# Patient Record
Sex: MALE | Race: ASIAN
[De-identification: names, ages, dates, MRNs, and addresses within clinical notes are randomized per-mention and may not be internally consistent; named-entity substitution may affect disease eponyms.]

---

## 2023-07-19 ENCOUNTER — OFFICE VISIT (OUTPATIENT)
Facility: CLINIC | Age: 48
End: 2023-07-19
Payer: MEDICAID

## 2023-07-19 VITALS
HEIGHT: 60 IN | HEART RATE: 80 BPM | WEIGHT: 125.6 LBS | TEMPERATURE: 97.5 F | RESPIRATION RATE: 16 BRPM | DIASTOLIC BLOOD PRESSURE: 83 MMHG | BODY MASS INDEX: 24.66 KG/M2 | SYSTOLIC BLOOD PRESSURE: 123 MMHG | OXYGEN SATURATION: 98 %

## 2023-07-19 DIAGNOSIS — E55.9 VITAMIN D DEFICIENCY: ICD-10-CM

## 2023-07-19 DIAGNOSIS — Z12.11 COLON CANCER SCREENING: ICD-10-CM

## 2023-07-19 DIAGNOSIS — S88.119A BELOW KNEE AMPUTATION (HCC): ICD-10-CM

## 2023-07-19 DIAGNOSIS — G89.29 CHRONIC BILATERAL LOW BACK PAIN WITH BILATERAL SCIATICA: ICD-10-CM

## 2023-07-19 DIAGNOSIS — Z02.89 MEDICATION MANAGEMENT CONTRACT SIGNED: ICD-10-CM

## 2023-07-19 DIAGNOSIS — Z76.89 ENCOUNTER TO ESTABLISH CARE: ICD-10-CM

## 2023-07-19 DIAGNOSIS — Z87.828 HISTORY OF GUNSHOT WOUND: Primary | ICD-10-CM

## 2023-07-19 DIAGNOSIS — M54.41 CHRONIC BILATERAL LOW BACK PAIN WITH BILATERAL SCIATICA: ICD-10-CM

## 2023-07-19 DIAGNOSIS — M54.42 CHRONIC BILATERAL LOW BACK PAIN WITH BILATERAL SCIATICA: ICD-10-CM

## 2023-07-19 PROCEDURE — 99204 OFFICE O/P NEW MOD 45 MIN: CPT

## 2023-07-19 RX ORDER — GABAPENTIN 300 MG/1
300 CAPSULE ORAL 2 TIMES DAILY
Qty: 180 CAPSULE | Refills: 1 | Status: SHIPPED | OUTPATIENT
Start: 2023-07-19 | End: 2024-01-15

## 2023-07-19 SDOH — ECONOMIC STABILITY: FOOD INSECURITY: WITHIN THE PAST 12 MONTHS, THE FOOD YOU BOUGHT JUST DIDN'T LAST AND YOU DIDN'T HAVE MONEY TO GET MORE.: NEVER TRUE

## 2023-07-19 SDOH — ECONOMIC STABILITY: FOOD INSECURITY: WITHIN THE PAST 12 MONTHS, YOU WORRIED THAT YOUR FOOD WOULD RUN OUT BEFORE YOU GOT MONEY TO BUY MORE.: NEVER TRUE

## 2023-07-19 SDOH — ECONOMIC STABILITY: HOUSING INSECURITY
IN THE LAST 12 MONTHS, WAS THERE A TIME WHEN YOU DID NOT HAVE A STEADY PLACE TO SLEEP OR SLEPT IN A SHELTER (INCLUDING NOW)?: NO

## 2023-07-19 SDOH — ECONOMIC STABILITY: INCOME INSECURITY: HOW HARD IS IT FOR YOU TO PAY FOR THE VERY BASICS LIKE FOOD, HOUSING, MEDICAL CARE, AND HEATING?: NOT HARD AT ALL

## 2023-07-19 ASSESSMENT — PATIENT HEALTH QUESTIONNAIRE - PHQ9
SUM OF ALL RESPONSES TO PHQ QUESTIONS 1-9: 0
SUM OF ALL RESPONSES TO PHQ QUESTIONS 1-9: 0
2. FEELING DOWN, DEPRESSED OR HOPELESS: 0
1. LITTLE INTEREST OR PLEASURE IN DOING THINGS: 0
SUM OF ALL RESPONSES TO PHQ QUESTIONS 1-9: 0
SUM OF ALL RESPONSES TO PHQ9 QUESTIONS 1 & 2: 0
SUM OF ALL RESPONSES TO PHQ QUESTIONS 1-9: 0

## 2023-07-19 NOTE — PROGRESS NOTES
Himanshu Ellis is a 51 y.o. presents today for   Chief Complaint   Patient presents with    Establish Care       Is someone accompanying this pt? YES,DAUGHTER IN WAITING ROOM     Is the patient using any DME equipment during OV? NO    Depression Screening:   PHQ-9 Questionaire 7/19/2023   Little interest or pleasure in doing things 0   Feeling down, depressed, or hopeless 0   PHQ-9 Total Score 0       Abuse Screening: AMB Abuse Screening 7/19/2023   Do you ever feel afraid of your partner? N   Are you in a relationship with someone who physically or mentally threatens you? N   Is it safe for you to go home? Y       Learning Assessment:  Who is the primary learner? Patient    What is the preferred language for health care of the primary learner? OTHER (COMMENT) [place]an   How does the primary learner prefer to learn new concepts? DEMONSTRATION    How does the primary learner prefer to learn new concepts? PICTURES    Answered By  #[id number]   Relationship to Learner SELF    Highest level of education completed by primary learner? DID NOT GRADUATE HIGH SCHOOL    Are there any barriers / factors that could impact learning? LANGUAGE    Will there be a co-learner / caregiver? No    Co-Learner / Caregiver Name (if applicable) N        Fall Risk:  No flowsheet data found. Coordination of Care:   1. \"Have you been to the ER, urgent care clinic since your last visit? Hospitalized since your last visit? \" NO    2. \"Have you seen or consulted any other health care providers outside of the [address] [place] [address] Avenue since your last visit? \" [place]        If the patient is female:    4. For patients aged 43-66: Has the patient had a mammogram within the past 2 years? NA     5. For patients aged 21-65: Has the patient had a pap smear? NA    Health Maintenance: reviewed and discussed and ordered per Provider.     Health Maintenance Due   Topic Date Due    COVID-19 Vaccine (1) Never done    Depression Screen

## 2023-07-20 DIAGNOSIS — M54.41 CHRONIC BILATERAL LOW BACK PAIN WITH BILATERAL SCIATICA: ICD-10-CM

## 2023-07-20 DIAGNOSIS — M54.42 CHRONIC BILATERAL LOW BACK PAIN WITH BILATERAL SCIATICA: ICD-10-CM

## 2023-07-20 DIAGNOSIS — G89.29 CHRONIC BILATERAL LOW BACK PAIN WITH BILATERAL SCIATICA: ICD-10-CM

## 2023-07-23 RX ORDER — GABAPENTIN 300 MG/1
300 CAPSULE ORAL 2 TIMES DAILY
Qty: 180 CAPSULE | Refills: 1 | OUTPATIENT
Start: 2023-07-23 | End: 2024-01-19

## 2023-08-03 ENCOUNTER — TELEPHONE (OUTPATIENT)
Facility: CLINIC | Age: 48
End: 2023-08-03

## 2023-08-03 NOTE — TELEPHONE ENCOUNTER
:PT facility is calling requesting for a fax to be sent indicationg \"Treat and Eval\" for new prosetitis. Please fax attn: Lisha Camilo at [phone]    Please advise.     Thank you

## 2023-08-04 DIAGNOSIS — Z44.9 PROSTHESIS FITTING: Primary | ICD-10-CM

## 2023-08-11 ENCOUNTER — HOSPITAL ENCOUNTER (OUTPATIENT)
Facility: HOSPITAL | Age: 48
Setting detail: SPECIMEN
End: 2023-08-11
Payer: MEDICAID

## 2023-08-11 DIAGNOSIS — G89.29 CHRONIC BILATERAL LOW BACK PAIN WITH BILATERAL SCIATICA: ICD-10-CM

## 2023-08-11 DIAGNOSIS — M54.41 CHRONIC BILATERAL LOW BACK PAIN WITH BILATERAL SCIATICA: ICD-10-CM

## 2023-08-11 DIAGNOSIS — Z87.828 HISTORY OF GUNSHOT WOUND: ICD-10-CM

## 2023-08-11 DIAGNOSIS — E55.9 VITAMIN D DEFICIENCY: ICD-10-CM

## 2023-08-11 DIAGNOSIS — Z76.89 ENCOUNTER TO ESTABLISH CARE: ICD-10-CM

## 2023-08-11 DIAGNOSIS — M54.42 CHRONIC BILATERAL LOW BACK PAIN WITH BILATERAL SCIATICA: ICD-10-CM

## 2023-08-11 LAB
25(OH)D3 SERPL-MCNC: 19.2 NG/ML (ref 30–100)
ALBUMIN SERPL-MCNC: 4 G/DL (ref 3.4–5)
ALBUMIN/GLOB SERPL: 1.1 (ref 0.8–1.7)
ALP SERPL-CCNC: 64 U/L (ref 45–117)
ALT SERPL-CCNC: 46 U/L (ref 16–61)
ANION GAP SERPL CALC-SCNC: 6 MMOL/L (ref 3–18)
APPEARANCE UR: CLEAR
AST SERPL-CCNC: 27 U/L (ref 10–38)
BACTERIA URNS QL MICRO: NEGATIVE /HPF
BASOPHILS # BLD: 0 K/UL (ref 0–0.1)
BASOPHILS NFR BLD: 1 % (ref 0–2)
BILIRUB DIRECT SERPL-MCNC: 0.1 MG/DL (ref 0–0.2)
BILIRUB SERPL-MCNC: 0.9 MG/DL (ref 0.2–1)
BILIRUB UR QL: NEGATIVE
BUN SERPL-MCNC: 12 MG/DL (ref 7–18)
BUN/CREAT SERPL: 13 (ref 12–20)
CALCIUM SERPL-MCNC: 9.4 MG/DL (ref 8.5–10.1)
CHLORIDE SERPL-SCNC: 103 MMOL/L (ref 100–111)
CHOLEST SERPL-MCNC: 286 MG/DL
CO2 SERPL-SCNC: 26 MMOL/L (ref 21–32)
COLOR UR: YELLOW
CREAT SERPL-MCNC: 0.94 MG/DL (ref 0.6–1.3)
DIFFERENTIAL METHOD BLD: ABNORMAL
EOSINOPHIL # BLD: 0.4 K/UL (ref 0–0.4)
EOSINOPHIL NFR BLD: 5 % (ref 0–5)
EPITH CASTS URNS QL MICRO: NEGATIVE /LPF (ref 0–5)
ERYTHROCYTE [DISTWIDTH] IN BLOOD BY AUTOMATED COUNT: 12.8 % (ref 11.6–14.5)
EST. AVERAGE GLUCOSE BLD GHB EST-MCNC: 105 MG/DL
GLOBULIN SER CALC-MCNC: 3.6 G/DL (ref 2–4)
GLUCOSE SERPL-MCNC: 88 MG/DL (ref 74–99)
GLUCOSE UR STRIP.AUTO-MCNC: NEGATIVE MG/DL
HBA1C MFR BLD: 5.3 % (ref 4.2–5.6)
HCT VFR BLD AUTO: 45.5 % (ref 36–48)
HDLC SERPL-MCNC: 67 MG/DL (ref 40–60)
HDLC SERPL: 4.3 (ref 0–5)
HGB BLD-MCNC: 15 G/DL (ref 13–16)
HGB UR QL STRIP: NEGATIVE
IMM GRANULOCYTES # BLD AUTO: 0 K/UL (ref 0–0.04)
IMM GRANULOCYTES NFR BLD AUTO: 0 % (ref 0–0.5)
KETONES UR QL STRIP.AUTO: NEGATIVE MG/DL
LDLC SERPL CALC-MCNC: 168.6 MG/DL (ref 0–100)
LEUKOCYTE ESTERASE UR QL STRIP.AUTO: NEGATIVE
LIPID PANEL: ABNORMAL
LYMPHOCYTES # BLD: 2.1 K/UL (ref 0.9–3.6)
LYMPHOCYTES NFR BLD: 27 % (ref 21–52)
MCH RBC QN AUTO: 30.9 PG (ref 24–34)
MCHC RBC AUTO-ENTMCNC: 33 G/DL (ref 31–37)
MCV RBC AUTO: 93.6 FL (ref 78–100)
MONOCYTES # BLD: 1 K/UL (ref 0.05–1.2)
MONOCYTES NFR BLD: 12 % (ref 3–10)
NEUTS SEG # BLD: 4.2 K/UL (ref 1.8–8)
NEUTS SEG NFR BLD: 55 % (ref 40–73)
NITRITE UR QL STRIP.AUTO: NEGATIVE
NRBC # BLD: 0 K/UL (ref 0–0.01)
NRBC BLD-RTO: 0 PER 100 WBC
PH UR STRIP: 5.5 (ref 5–8)
PLATELET # BLD AUTO: 231 K/UL (ref 135–420)
PMV BLD AUTO: 10.2 FL (ref 9.2–11.8)
POTASSIUM SERPL-SCNC: 4.2 MMOL/L (ref 3.5–5.5)
PROT SERPL-MCNC: 7.6 G/DL (ref 6.4–8.2)
PROT UR STRIP-MCNC: NEGATIVE MG/DL
RBC # BLD AUTO: 4.86 M/UL (ref 4.35–5.65)
RBC #/AREA URNS HPF: NEGATIVE /HPF (ref 0–5)
SODIUM SERPL-SCNC: 135 MMOL/L (ref 136–145)
SP GR UR REFRACTOMETRY: 1.01 (ref 1–1.03)
TRIGL SERPL-MCNC: 252 MG/DL
TSH SERPL DL<=0.05 MIU/L-ACNC: 3.24 UIU/ML (ref 0.36–3.74)
UROBILINOGEN UR QL STRIP.AUTO: 0.2 EU/DL (ref 0.2–1)
VLDLC SERPL CALC-MCNC: 50.4 MG/DL
WBC # BLD AUTO: 7.7 K/UL (ref 4.6–13.2)
WBC URNS QL MICRO: NORMAL /HPF (ref 0–4)

## 2023-08-11 PROCEDURE — 80076 HEPATIC FUNCTION PANEL: CPT

## 2023-08-11 PROCEDURE — 81001 URINALYSIS AUTO W/SCOPE: CPT

## 2023-08-11 PROCEDURE — 36415 COLL VENOUS BLD VENIPUNCTURE: CPT

## 2023-08-11 PROCEDURE — 84443 ASSAY THYROID STIM HORMONE: CPT

## 2023-08-11 PROCEDURE — 80061 LIPID PANEL: CPT

## 2023-08-11 PROCEDURE — 82306 VITAMIN D 25 HYDROXY: CPT

## 2023-08-11 PROCEDURE — 80048 BASIC METABOLIC PNL TOTAL CA: CPT

## 2023-08-11 PROCEDURE — 85025 COMPLETE CBC W/AUTO DIFF WBC: CPT

## 2023-08-11 PROCEDURE — 83036 HEMOGLOBIN GLYCOSYLATED A1C: CPT

## 2023-08-21 ENCOUNTER — OFFICE VISIT (OUTPATIENT)
Facility: CLINIC | Age: 48
End: 2023-08-21
Payer: MEDICAID

## 2023-08-21 VITALS
RESPIRATION RATE: 17 BRPM | BODY MASS INDEX: 24.94 KG/M2 | OXYGEN SATURATION: 97 % | HEART RATE: 83 BPM | DIASTOLIC BLOOD PRESSURE: 72 MMHG | WEIGHT: 127 LBS | HEIGHT: 60 IN | SYSTOLIC BLOOD PRESSURE: 113 MMHG | TEMPERATURE: 97.9 F

## 2023-08-21 DIAGNOSIS — H10.9 BACTERIAL CONJUNCTIVITIS OF BOTH EYES: ICD-10-CM

## 2023-08-21 DIAGNOSIS — M54.42 CHRONIC BILATERAL LOW BACK PAIN WITH BILATERAL SCIATICA: ICD-10-CM

## 2023-08-21 DIAGNOSIS — G89.29 CHRONIC BILATERAL LOW BACK PAIN WITH BILATERAL SCIATICA: ICD-10-CM

## 2023-08-21 DIAGNOSIS — M54.41 CHRONIC BILATERAL LOW BACK PAIN WITH BILATERAL SCIATICA: ICD-10-CM

## 2023-08-21 DIAGNOSIS — B96.89 BACTERIAL CONJUNCTIVITIS OF BOTH EYES: ICD-10-CM

## 2023-08-21 DIAGNOSIS — E78.2 MIXED HYPERLIPIDEMIA: ICD-10-CM

## 2023-08-21 DIAGNOSIS — H53.8 BLURRED VISION, BILATERAL: ICD-10-CM

## 2023-08-21 DIAGNOSIS — E55.9 VITAMIN D DEFICIENCY: Primary | ICD-10-CM

## 2023-08-21 PROCEDURE — 99213 OFFICE O/P EST LOW 20 MIN: CPT

## 2023-08-21 RX ORDER — ERGOCALCIFEROL 1.25 MG/1
50000 CAPSULE ORAL WEEKLY
Qty: 12 CAPSULE | Refills: 1 | Status: SHIPPED | OUTPATIENT
Start: 2023-08-21

## 2023-08-21 RX ORDER — ASPIRIN 81 MG/1
81 TABLET ORAL DAILY
Qty: 90 TABLET | Refills: 3 | Status: SHIPPED | OUTPATIENT
Start: 2023-08-21

## 2023-08-21 RX ORDER — OMEGA-3/DHA/EPA/FISH OIL 300-1000MG
2 CAPSULE ORAL DAILY
Qty: 90 CAPSULE | Refills: 0 | Status: SHIPPED | OUTPATIENT
Start: 2023-08-21

## 2023-08-21 RX ORDER — ATORVASTATIN CALCIUM 40 MG/1
40 TABLET, FILM COATED ORAL DAILY
Qty: 30 TABLET | Refills: 3 | Status: SHIPPED | OUTPATIENT
Start: 2023-08-21

## 2023-08-21 RX ORDER — GABAPENTIN 300 MG/1
300 CAPSULE ORAL 2 TIMES DAILY
Qty: 180 CAPSULE | Refills: 1 | Status: SHIPPED | OUTPATIENT
Start: 2023-08-21 | End: 2024-02-17

## 2023-08-21 RX ORDER — POLYMYXIN B SULFATE AND TRIMETHOPRIM 1; 10000 MG/ML; [USP'U]/ML
1 SOLUTION OPHTHALMIC EVERY 4 HOURS
Qty: 3 ML | Refills: 0 | Status: SHIPPED | OUTPATIENT
Start: 2023-08-21 | End: 2023-08-31

## 2023-08-21 SDOH — ECONOMIC STABILITY: INCOME INSECURITY: HOW HARD IS IT FOR YOU TO PAY FOR THE VERY BASICS LIKE FOOD, HOUSING, MEDICAL CARE, AND HEATING?: NOT HARD AT ALL

## 2023-08-21 SDOH — ECONOMIC STABILITY: FOOD INSECURITY: WITHIN THE PAST 12 MONTHS, THE FOOD YOU BOUGHT JUST DIDN'T LAST AND YOU DIDN'T HAVE MONEY TO GET MORE.: NEVER TRUE

## 2023-08-21 SDOH — ECONOMIC STABILITY: FOOD INSECURITY: WITHIN THE PAST 12 MONTHS, YOU WORRIED THAT YOUR FOOD WOULD RUN OUT BEFORE YOU GOT MONEY TO BUY MORE.: NEVER TRUE

## 2023-08-21 ASSESSMENT — PATIENT HEALTH QUESTIONNAIRE - PHQ9
SUM OF ALL RESPONSES TO PHQ QUESTIONS 1-9: 0
SUM OF ALL RESPONSES TO PHQ9 QUESTIONS 1 & 2: 0
SUM OF ALL RESPONSES TO PHQ QUESTIONS 1-9: 0
2. FEELING DOWN, DEPRESSED OR HOPELESS: 0
1. LITTLE INTEREST OR PLEASURE IN DOING THINGS: 0

## 2023-08-21 NOTE — PROGRESS NOTES
Dania Talavera is a 51y.o. year old male who presents in office today for   Chief Complaint   Patient presents with    Discuss Labs    Follow-up       Is someone accompanying this pt? Yes, daughter    Is the patient using any DME equipment during [address]? no    Depression Screening:   PHQ-9 Questionaire 8/21/2023 7/19/2023   Little interest or pleasure in doing things 0 0   Feeling down, depressed, or hopeless 0 0   PHQ-9 Total Score 0 0       Abuse Screening: AMB Abuse Screening 8/21/2023 7/19/2023   Do you ever feel afraid of your partner? N N   Are you in a relationship with someone who physically or mentally threatens you? N N   Is it safe for you to go home? Y Y       Learning Assessment:  No question data found. Fall Risk:  No flowsheet data found. Coordination of Care:   1. \"Have you been to the ER, urgent care clinic since your last visit? Hospitalized since your last visit? \" no    2. \"Have you seen or consulted any other health care providers outside of the [address] [place] [address] since your last visit? \" no    3. For patients aged 43-73: Has the patient had a colonoscopy / FIT/ Cologuard? due    If the patient is female:    4. For patients aged 43-66: Has the patient had a mammogram within the past 2 years? na    5. For patients aged 21-65: Has the patient had a pap smear? na    Health Maintenance: reviewed and discussed and ordered per Provider.     Health Maintenance Due   Topic Date Due    COVID-19 Vaccine (1) Never done    HIV screen  Never done    Hepatitis C screen  Never done    DTaP/Tdap/Td vaccine (1 - Tdap) Never done    Colorectal Cancer Screen  Never done    Flu vaccine (1) Never done        -Sandra Laguna [id number]  [place], [address] [place] [address]  Ph: [phone]

## 2023-08-21 NOTE — PROGRESS NOTES
Ga Dunne (: 1975) is a 51 y.o. male, established patient, here for evaluation of the following chief complaint(s):  Discuss Labs and Follow-up             Subjective:     HPI:  [facility]  was used for encounter. Patient presents for follow-up of lab results. He reports that he has been having yellow crusting to eyelids, no drainage. He denies any eye pain but does report blurred vision. He denies any recent fevers, no cellulitis to eyes or face. No past medical history on file. No past surgical history on file. Current Outpatient Medications   Medication Sig Dispense Refill    vitamin D (ERGOCALCIFEROL) 1.25 MG (23107 UT) CAPS capsule Take 1 capsule by mouth once a week 12 capsule 1    trimethoprim-polymyxin b (POLYTRIM) 30839-5.1 UNIT/ML-% ophthalmic solution Place 1 drop into both eyes every 4 hours for 10 days 3 mL 0    fish oil-omega-3 fatty acids 1000 MG capsule Take 2 capsules by mouth daily 90 capsule 0    atorvastatin (LIPITOR) 40 MG tablet Take 1 tablet by mouth daily 30 tablet 3    aspirin 81 MG EC tablet Take 1 tablet by mouth daily 90 tablet 3    gabapentin (NEURONTIN) 300 MG capsule Take 1 capsule by mouth 2 times daily for 180 days. Intended supply: 90 days Max Daily Amount: 600 mg 180 capsule 1     No current facility-administered medications for this visit.           ROS:    General: negative for - chills, fever, weight changes or malaise  HEENT: no sore throat, nasal congestion, vision problems or ear problems  Respiratory: no cough, shortness of breath, or wheezing  Cardiovascular: no chest pain, palpitations, or dyspnea on exertion  Gastrointestinal: no abdominal pain, N/V, change in bowel habits  Musculoskeletal: no back pain or joint pain  Neurological: no headache or dizziness  Endo:  No polyuria or polydipsia  : no urinary  Psychological: negative for - anxiety, depression, sleeps issues       Objective:     Vitals:    23 1516   BP: 113/72

## 2023-08-24 ENCOUNTER — HOSPITAL ENCOUNTER (OUTPATIENT)
Facility: HOSPITAL | Age: 48
End: 2023-08-24
Payer: MEDICAID

## 2023-08-24 ENCOUNTER — HOSPITAL ENCOUNTER (OUTPATIENT)
Facility: HOSPITAL | Age: 48
Discharge: HOME | End: 2023-08-24
Payer: MEDICAID

## 2023-08-24 DIAGNOSIS — G89.29 CHRONIC BILATERAL LOW BACK PAIN WITH BILATERAL SCIATICA: ICD-10-CM

## 2023-08-24 DIAGNOSIS — M54.42 CHRONIC BILATERAL LOW BACK PAIN WITH BILATERAL SCIATICA: ICD-10-CM

## 2023-08-24 DIAGNOSIS — M54.41 CHRONIC BILATERAL LOW BACK PAIN WITH BILATERAL SCIATICA: ICD-10-CM

## 2023-08-24 PROCEDURE — 6360000004 HC RX CONTRAST MEDICATION

## 2023-08-24 PROCEDURE — 72158 MRI LUMBAR SPINE W/O & W/DYE: CPT

## 2023-08-24 PROCEDURE — A9577 INJ MULTIHANCE: HCPCS

## 2023-08-24 PROCEDURE — 72156 MRI NECK SPINE W/O & W/DYE: CPT

## 2023-08-24 RX ADMIN — GADOBENATE DIMEGLUMINE 12 ML: 529 INJECTION, SOLUTION INTRAVENOUS at 10:27

## 2023-09-27 ENCOUNTER — OFFICE VISIT (OUTPATIENT)
Facility: CLINIC | Age: 48
End: 2023-09-27
Payer: MEDICAID

## 2023-09-27 VITALS
BODY MASS INDEX: 25.52 KG/M2 | DIASTOLIC BLOOD PRESSURE: 64 MMHG | TEMPERATURE: 98.2 F | HEART RATE: 85 BPM | WEIGHT: 130 LBS | RESPIRATION RATE: 16 BRPM | HEIGHT: 60 IN | SYSTOLIC BLOOD PRESSURE: 107 MMHG | OXYGEN SATURATION: 98 %

## 2023-09-27 DIAGNOSIS — G89.29 CHRONIC MIDLINE LOW BACK PAIN WITHOUT SCIATICA: ICD-10-CM

## 2023-09-27 DIAGNOSIS — M50.20 CERVICAL DISC HERNIATION: Primary | ICD-10-CM

## 2023-09-27 DIAGNOSIS — M51.36 BULGING LUMBAR DISC: ICD-10-CM

## 2023-09-27 DIAGNOSIS — M54.50 CHRONIC MIDLINE LOW BACK PAIN WITHOUT SCIATICA: ICD-10-CM

## 2023-09-27 PROCEDURE — 99213 OFFICE O/P EST LOW 20 MIN: CPT

## 2023-09-27 RX ORDER — MELOXICAM 7.5 MG/1
7.5 TABLET ORAL DAILY
Qty: 30 TABLET | Refills: 3 | Status: SHIPPED | OUTPATIENT
Start: 2023-09-27

## 2023-09-27 RX ORDER — GABAPENTIN 600 MG/1
600 TABLET ORAL 2 TIMES DAILY
Qty: 60 TABLET | Refills: 2 | Status: SHIPPED | OUTPATIENT
Start: 2023-09-27 | End: 2023-12-26

## 2023-09-27 ASSESSMENT — PATIENT HEALTH QUESTIONNAIRE - PHQ9
5. POOR APPETITE OR OVEREATING: 0
SUM OF ALL RESPONSES TO PHQ QUESTIONS 1-9: 0
6. FEELING BAD ABOUT YOURSELF - OR THAT YOU ARE A FAILURE OR HAVE LET YOURSELF OR YOUR FAMILY DOWN: 0
SUM OF ALL RESPONSES TO PHQ QUESTIONS 1-9: 0
7. TROUBLE CONCENTRATING ON THINGS, SUCH AS READING THE NEWSPAPER OR WATCHING TELEVISION: 0
2. FEELING DOWN, DEPRESSED OR HOPELESS: 0
SUM OF ALL RESPONSES TO PHQ QUESTIONS 1-9: 0
9. THOUGHTS THAT YOU WOULD BE BETTER OFF DEAD, OR OF HURTING YOURSELF: 0
3. TROUBLE FALLING OR STAYING ASLEEP: 0
4. FEELING TIRED OR HAVING LITTLE ENERGY: 0
SUM OF ALL RESPONSES TO PHQ9 QUESTIONS 1 & 2: 0
10. IF YOU CHECKED OFF ANY PROBLEMS, HOW DIFFICULT HAVE THESE PROBLEMS MADE IT FOR YOU TO DO YOUR WORK, TAKE CARE OF THINGS AT HOME, OR GET ALONG WITH OTHER PEOPLE: 0
8. MOVING OR SPEAKING SO SLOWLY THAT OTHER PEOPLE COULD HAVE NOTICED. OR THE OPPOSITE, BEING SO FIGETY OR RESTLESS THAT YOU HAVE BEEN MOVING AROUND A LOT MORE THAN USUAL: 0
1. LITTLE INTEREST OR PLEASURE IN DOING THINGS: 0
SUM OF ALL RESPONSES TO PHQ QUESTIONS 1-9: 0

## 2023-09-27 ASSESSMENT — ANXIETY QUESTIONNAIRES
2. NOT BEING ABLE TO STOP OR CONTROL WORRYING: 0
5. BEING SO RESTLESS THAT IT IS HARD TO SIT STILL: 0
4. TROUBLE RELAXING: 0
GAD7 TOTAL SCORE: 0
6. BECOMING EASILY ANNOYED OR IRRITABLE: 0
IF YOU CHECKED OFF ANY PROBLEMS ON THIS QUESTIONNAIRE, HOW DIFFICULT HAVE THESE PROBLEMS MADE IT FOR YOU TO DO YOUR WORK, TAKE CARE OF THINGS AT HOME, OR GET ALONG WITH OTHER PEOPLE: NOT DIFFICULT AT ALL
1. FEELING NERVOUS, ANXIOUS, OR ON EDGE: 0
7. FEELING AFRAID AS IF SOMETHING AWFUL MIGHT HAPPEN: 0
3. WORRYING TOO MUCH ABOUT DIFFERENT THINGS: 0

## 2023-09-27 NOTE — PROGRESS NOTES
Patient ID: Amanda Hernandez is a 51 y.o. male established patient presents for the following:      Subjective:     [facility]  was used for encounter. Patient presents for follow up of back pain:  He reports severe back pain and neck pain rated 9/10. He reports that he had MRI of back in [facility]. He has completed physical therapy minimal results. He was previously started on gabapentin and meloxicam for his back pain. He reports improvement in symptoms. He states that back pain is now rated 3 out of 10 on a daily basis instead of 7 out of 10.    8/28/23 MRI cervical spine:  IMPRESSION:  1. Moderate central stenosis at C3-C4 from posterior disc bulge and central disc  protrusion. 2. Mild to moderate foraminal stenosis    8/28/23 MRI lumbar spine:  L1-L2: Mild posterior disc bulge with no central or foraminal stenosis. L2-L3, L3-L4 and L4-L5: No disc herniation or central stenosis. Mild facet and  ligamentous hypertrophy with no foraminal stenosis. L5-S1: Moderate central and right paracentral disc protrusion, contacting right  descending S1 nerve root. Otherwise, no central stenosis With facet and  ligamentous hypertrophy, mild right foraminal stenosis with disc material  contacting right exiting L5 nerve root. No significant left foraminal stenosis. No past medical history on file. No past surgical history on file. Current Outpatient Medications   Medication Sig Dispense Refill    meloxicam (MOBIC) 7.5 MG tablet Take 1 tablet by mouth daily 30 tablet 3    gabapentin (NEURONTIN) 600 MG tablet Take 1 tablet by mouth 2 times daily for 90 days.  Max Daily Amount: 1,200 mg 60 tablet 2    vitamin D (ERGOCALCIFEROL) 1.25 MG (84138 UT) CAPS capsule Take 1 capsule by mouth once a week 12 capsule 1    fish oil-omega-3 fatty acids 1000 MG capsule Take 2 capsules by mouth daily 90 capsule 0    atorvastatin (LIPITOR) 40 MG tablet Take 1 tablet by mouth daily 30 tablet 3    aspirin 81 MG

## 2023-09-27 NOTE — PROGRESS NOTES
Herberth Jackson is a 51y.o. year old male who presents in office today for   Chief Complaint   Patient presents with    Follow-up       Is someone accompanying this pt? NO    Is the patient using any DME equipment during OV? NO    Depression Screenin/21/2023     3:18 PM 2023     2:57 PM   PHQ-9 Questionaire   Little interest or pleasure in doing things 0 0   Feeling down, depressed, or hopeless 0 0   PHQ-9 Total Score 0 0       Abuse Screenin/27/2023     3:00 PM 2023     3:00 PM 2023     2:00 PM   AMB Abuse Screening   Do you ever feel afraid of your partner? N N N   Are you in a relationship with someone who physically or mentally threatens you? N N N   Is it safe for you to go home? Y Y Y       Learning Assessment:  No question data found. Fall Risk:       No data to display                    Coordination of Care:   1. \"Have you been to the ER, urgent care clinic since your last visit? Hospitalized since your last visit? \" NO     2. \"Have you seen or consulted any other health care providers outside of the [address] [place] [address] since your last visit? \" NO    3. For patients aged 43-73: Has the patient had a colonoscopy / FIT/ Cologuard? DUE    If the patient is female:    4. For patients aged 43-66: Has the patient had a mammogram within the past 2 years? NA    5. For patients aged 21-65: Has the patient had a pap smear? NA    Health Maintenance: reviewed and discussed and ordered per Provider.     Health Maintenance Due   Topic Date Due    Hepatitis B vaccine (1 of 3 - 3-dose series) Never done    COVID-19 Vaccine (1) Never done    HIV screen  Never done    Hepatitis C screen  Never done    DTaP/Tdap/Td vaccine (1 - Tdap) Never done    Colorectal Cancer Screen  Never done    Flu vaccine (1) Never done        -Sandra Fang [id number]  [place], [place]  Ph: [phone]

## 2023-09-29 ENCOUNTER — NURSE ONLY (OUTPATIENT)
Facility: CLINIC | Age: 48
End: 2023-09-29

## 2023-09-29 NOTE — PROGRESS NOTES
Worked with patient getting on medicaid and insurance updated for him and family. . we applied his wifes outstanding bill to [employer] financial aid. Also got patient scheduled for Monday Oct. 2, 2023 with [facility] on [facility]. Also patient would like  to see if he can request Danieal Barnett # [id number] at future visits, (if Audrey Goodell is available).

## 2023-10-04 ASSESSMENT — ENCOUNTER SYMPTOMS
NAUSEA: 0
DIARRHEA: 0
WHEEZING: 0
VOICE CHANGE: 0
COUGH: 0
SORE THROAT: 0
CONSTIPATION: 0
BLOOD IN STOOL: 0
SINUS PRESSURE: 0
VOMITING: 0
TROUBLE SWALLOWING: 0
EYES NEGATIVE: 1
BACK PAIN: 1
SHORTNESS OF BREATH: 0

## 2023-12-13 ENCOUNTER — OFFICE VISIT (OUTPATIENT)
Facility: CLINIC | Age: 48
End: 2023-12-13
Payer: MEDICAID

## 2023-12-13 VITALS
SYSTOLIC BLOOD PRESSURE: 107 MMHG | TEMPERATURE: 97.8 F | BODY MASS INDEX: 25.72 KG/M2 | WEIGHT: 131 LBS | RESPIRATION RATE: 17 BRPM | HEIGHT: 60 IN | HEART RATE: 74 BPM | DIASTOLIC BLOOD PRESSURE: 70 MMHG | OXYGEN SATURATION: 97 %

## 2023-12-13 DIAGNOSIS — M50.20 CERVICAL DISC HERNIATION: Primary | ICD-10-CM

## 2023-12-13 PROCEDURE — 99213 OFFICE O/P EST LOW 20 MIN: CPT

## 2023-12-13 RX ORDER — GABAPENTIN 300 MG/1
300 CAPSULE ORAL 2 TIMES DAILY
Qty: 180 CAPSULE | Refills: 1 | Status: SHIPPED | OUTPATIENT
Start: 2023-12-13 | End: 2024-06-10

## 2023-12-13 NOTE — PROGRESS NOTES
Herberth Jackson is a 51y.o. year old male who presents in office today for   Chief Complaint   Patient presents with    Follow-up       Is someone accompanying this pt? DAUGHTER IS IN WAITING ROOM    Is the patient using any DME equipment during OV? NO    Depression Screenin/13/2023     3:34 PM 2023     3:40 PM 2023     3:18 PM 2023     2:57 PM   PHQ-9 Questionaire   Little interest or pleasure in doing things 0 0 0 0   Feeling down, depressed, or hopeless 0 0 0 0   Trouble falling or staying asleep, or sleeping too much  0     Feeling tired or having little energy  0     Poor appetite or overeating  0     Feeling bad about yourself - or that you are a failure or have let yourself or your family down  0     Trouble concentrating on things, such as reading the newspaper or watching television  0     Moving or speaking so slowly that other people could have noticed. Or the opposite - being so fidgety or restless that you have been moving around a lot more than usual  0     Thoughts that you would be better off dead, or of hurting yourself in some way  0     PHQ-9 Total Score 0 0 0 0   If you checked off any problems, how difficult have these problems made it for you to do your work, take care of things at home, or get along with other people? 0         Abuse Screenin/13/2023     3:00 PM 2023     8:00 AM 2023     3:00 PM 2023     3:00 PM 2023     2:00 PM   AMB Abuse Screening   Do you ever feel afraid of your partner? N N N N N   Are you in a relationship with someone who physically or mentally threatens you? N N N N N   Is it safe for you to go home? Y Y Y Y Y       Learning Assessment:  No question data found. Fall Risk:       No data to display                    Coordination of Care:   1. \"Have you been to the ER, urgent care clinic since your last visit? Hospitalized since your last visit? \" NO    2.  \"Have you seen or consulted any other health care

## 2023-12-13 NOTE — PROGRESS NOTES
Patient ID: Caleb Greene is a 51 y.o. male established patient presents for the following:      Subjective:      [facility]  was used for encounter. Patient presents for follow up of back pain:  He reports severe back pain and neck pain rated 9/10. He reports that he had MRI of back in [facility]. He has completed physical therapy minimal results. He was previously started on gabapentin and meloxicam for his back pain. He reports improvement in symptoms. He states that back pain is now rated 3 out of 10 on a daily basis instead of 7 out of 10.    8/28/23 MRI cervical spine:  IMPRESSION:  1. Moderate central stenosis at C3-C4 from posterior disc bulge and central disc  protrusion. 2. Mild to moderate foraminal stenosis    8/28/23 MRI lumbar spine:  L1-L2: Mild posterior disc bulge with no central or foraminal stenosis. L2-L3, L3-L4 and L4-L5: No disc herniation or central stenosis. Mild facet and  ligamentous hypertrophy with no foraminal stenosis. L5-S1: Moderate central and right paracentral disc protrusion, contacting right  descending S1 nerve root. Otherwise, no central stenosis With facet and  ligamentous hypertrophy, mild right foraminal stenosis with disc material  contacting right exiting L5 nerve root. No significant left foraminal stenosis. No past medical history on file. No past surgical history on file. Current Outpatient Medications   Medication Sig Dispense Refill    gabapentin (NEURONTIN) 300 MG capsule Take 1 capsule by mouth 2 times daily for 180 days.  Intended supply: 90 days Max Daily Amount: 600 mg 180 capsule 1    meloxicam (MOBIC) 7.5 MG tablet Take 1 tablet by mouth daily 30 tablet 3    vitamin D (ERGOCALCIFEROL) 1.25 MG (38768 UT) CAPS capsule Take 1 capsule by mouth once a week 12 capsule 1    fish oil-omega-3 fatty acids 1000 MG capsule Take 2 capsules by mouth daily 90 capsule 0    atorvastatin (LIPITOR) 40 MG tablet Take 1 tablet by mouth daily 30

## 2024-02-21 ENCOUNTER — OFFICE VISIT (OUTPATIENT)
Facility: CLINIC | Age: 49
End: 2024-02-21

## 2024-02-21 VITALS
SYSTOLIC BLOOD PRESSURE: 112 MMHG | OXYGEN SATURATION: 97 % | DIASTOLIC BLOOD PRESSURE: 69 MMHG | HEART RATE: 94 BPM | WEIGHT: 130 LBS | HEIGHT: 60 IN | RESPIRATION RATE: 17 BRPM | TEMPERATURE: 97.7 F | BODY MASS INDEX: 25.52 KG/M2

## 2024-02-21 DIAGNOSIS — J06.9 VIRAL URI: Primary | ICD-10-CM

## 2024-02-21 DIAGNOSIS — H66.003 NON-RECURRENT ACUTE SUPPURATIVE OTITIS MEDIA OF BOTH EARS WITHOUT SPONTANEOUS RUPTURE OF TYMPANIC MEMBRANES: ICD-10-CM

## 2024-02-21 DIAGNOSIS — H60.503 ACUTE OTITIS EXTERNA OF BOTH EARS, UNSPECIFIED TYPE: ICD-10-CM

## 2024-02-21 LAB
EXP DATE SOLUTION: NORMAL
EXP DATE SWAB: NORMAL
EXPIRATION DATE: NORMAL
LOT NUMBER POC: NORMAL
LOT NUMBER SOLUTION: NORMAL
LOT NUMBER SWAB: NORMAL
QUICKVUE INFLUENZA TEST: NEGATIVE
SARS-COV-2 RNA, POC: NEGATIVE
VALID INTERNAL CONTROL, POC: YES

## 2024-02-21 RX ORDER — AMOXICILLIN AND CLAVULANATE POTASSIUM 875; 125 MG/1; MG/1
1 TABLET, FILM COATED ORAL 2 TIMES DAILY
Qty: 14 TABLET | Refills: 0 | Status: SHIPPED | OUTPATIENT
Start: 2024-02-21 | End: 2024-02-22 | Stop reason: SINTOL

## 2024-02-21 RX ORDER — OFLOXACIN 3 MG/ML
5 SOLUTION AURICULAR (OTIC) 2 TIMES DAILY
Qty: 10 ML | Refills: 0 | Status: SHIPPED | OUTPATIENT
Start: 2024-02-21 | End: 2024-03-02

## 2024-02-21 RX ORDER — GUAIFENESIN 600 MG/1
600 TABLET, EXTENDED RELEASE ORAL 2 TIMES DAILY
Qty: 30 TABLET | Refills: 0 | Status: SHIPPED | OUTPATIENT
Start: 2024-02-21 | End: 2024-03-07

## 2024-02-21 SDOH — ECONOMIC STABILITY: INCOME INSECURITY: HOW HARD IS IT FOR YOU TO PAY FOR THE VERY BASICS LIKE FOOD, HOUSING, MEDICAL CARE, AND HEATING?: NOT HARD AT ALL

## 2024-02-21 SDOH — ECONOMIC STABILITY: FOOD INSECURITY: WITHIN THE PAST 12 MONTHS, THE FOOD YOU BOUGHT JUST DIDN'T LAST AND YOU DIDN'T HAVE MONEY TO GET MORE.: NEVER TRUE

## 2024-02-21 SDOH — ECONOMIC STABILITY: FOOD INSECURITY: WITHIN THE PAST 12 MONTHS, YOU WORRIED THAT YOUR FOOD WOULD RUN OUT BEFORE YOU GOT MONEY TO BUY MORE.: NEVER TRUE

## 2024-02-21 ASSESSMENT — PATIENT HEALTH QUESTIONNAIRE - PHQ9
SUM OF ALL RESPONSES TO PHQ9 QUESTIONS 1 & 2: 0
SUM OF ALL RESPONSES TO PHQ QUESTIONS 1-9: 0
SUM OF ALL RESPONSES TO PHQ QUESTIONS 1-9: 0
1. LITTLE INTEREST OR PLEASURE IN DOING THINGS: 0
SUM OF ALL RESPONSES TO PHQ QUESTIONS 1-9: 0
SUM OF ALL RESPONSES TO PHQ QUESTIONS 1-9: 0
2. FEELING DOWN, DEPRESSED OR HOPELESS: 0

## 2024-02-21 NOTE — PROGRESS NOTES
Patient ID: Villa Sanchez is a 48 y.o. male established patient presents for the following:      Subjective:     He reports cough and congestion that started for 1 month. He reports subjective fever and chills that started 2 weeks ago and broke last night. He also reports fatigue and body aches.   Cough is productive with white sputum.        No past medical history on file.    No past surgical history on file.    Current Outpatient Medications   Medication Sig Dispense Refill    ofloxacin (FLOXIN) 0.3 % otic solution Place 5 drops in ear(s) 2 times daily for 10 days 10 mL 0    guaiFENesin (MUCINEX) 600 MG extended release tablet Take 1 tablet by mouth 2 times daily for 15 days 30 tablet 0    amoxicillin-clavulanate (AUGMENTIN) 875-125 MG per tablet Take 1 tablet by mouth 2 times daily for 7 days 14 tablet 0    gabapentin (NEURONTIN) 300 MG capsule Take 1 capsule by mouth 2 times daily for 180 days. Intended supply: 90 days Max Daily Amount: 600 mg 180 capsule 1    meloxicam (MOBIC) 7.5 MG tablet Take 1 tablet by mouth daily 30 tablet 3    vitamin D (ERGOCALCIFEROL) 1.25 MG (37222 UT) CAPS capsule Take 1 capsule by mouth once a week 12 capsule 1    fish oil-omega-3 fatty acids 1000 MG capsule Take 2 capsules by mouth daily 90 capsule 0    atorvastatin (LIPITOR) 40 MG tablet Take 1 tablet by mouth daily 30 tablet 3    aspirin 81 MG EC tablet Take 1 tablet by mouth daily 90 tablet 3     No current facility-administered medications for this visit.          ROS   Review of Systems   Constitutional:  Positive for chills, fatigue and fever. Negative for appetite change and unexpected weight change.   HENT:  Positive for congestion and sinus pressure. Negative for ear pain, hearing loss, sore throat, trouble swallowing and voice change.    Eyes:  Negative for pain, redness, itching and visual disturbance.   Respiratory:  Positive for cough. Negative for chest tightness, shortness of breath, wheezing and stridor.

## 2024-02-21 NOTE — PROGRESS NOTES
Villa Sanchez is a 48 y.o. year old male who presents in office today for   Chief Complaint   Patient presents with    6 Month Follow-Up       Is someone accompanying this pt? NO    Is the patient using any DME equipment during OV? NO    Depression Screenin/21/2024     3:29 PM 2023     3:34 PM 2023     3:40 PM 2023     3:18 PM 2023     2:57 PM   PHQ-9 Questionaire   Little interest or pleasure in doing things 0 0 0 0 0   Feeling down, depressed, or hopeless 0 0 0 0 0   Trouble falling or staying asleep, or sleeping too much   0     Feeling tired or having little energy   0     Poor appetite or overeating   0     Feeling bad about yourself - or that you are a failure or have let yourself or your family down   0     Trouble concentrating on things, such as reading the newspaper or watching television   0     Moving or speaking so slowly that other people could have noticed. Or the opposite - being so fidgety or restless that you have been moving around a lot more than usual   0     Thoughts that you would be better off dead, or of hurting yourself in some way   0     PHQ-9 Total Score 0 0 0 0 0   If you checked off any problems, how difficult have these problems made it for you to do your work, take care of things at home, or get along with other people?   0         Abuse Screenin/21/2024     3:00 PM 2023     3:00 PM 2023     8:00 AM 2023     3:00 PM 2023     3:00 PM 2023     2:00 PM   AMB Abuse Screening   Do you ever feel afraid of your partner? N N N N N N   Are you in a relationship with someone who physically or mentally threatens you? N N N N N N   Is it safe for you to go home? Y Y Y Y Y Y       Learning Assessment:  No question data found.    Fall Risk:       No data to display                    Coordination of Care:   1. \"Have you been to the ER, urgent care clinic since your last visit?  Hospitalized since your last visit?\" NO    2. \"Have

## 2024-02-22 DIAGNOSIS — J06.9 VIRAL URI: Primary | ICD-10-CM

## 2024-02-22 DIAGNOSIS — J01.90 ACUTE NON-RECURRENT SINUSITIS, UNSPECIFIED LOCATION: ICD-10-CM

## 2024-02-22 RX ORDER — AMOXICILLIN AND CLAVULANATE POTASSIUM 875; 125 MG/1; MG/1
1 TABLET, FILM COATED ORAL 2 TIMES DAILY
Qty: 14 TABLET | Refills: 0 | Status: SHIPPED | OUTPATIENT
Start: 2024-02-22 | End: 2024-02-29

## 2024-02-23 ASSESSMENT — ENCOUNTER SYMPTOMS
COUGH: 1
COLOR CHANGE: 0
EYE REDNESS: 0
VOICE CHANGE: 0
EYE ITCHING: 0
STRIDOR: 0
WHEEZING: 0
DIARRHEA: 0
NAUSEA: 0
VOMITING: 0
SHORTNESS OF BREATH: 0
CONSTIPATION: 0
BLOOD IN STOOL: 0
CHEST TIGHTNESS: 0
EYE PAIN: 0
TROUBLE SWALLOWING: 0
SORE THROAT: 0
SINUS PRESSURE: 1

## 2024-07-02 ENCOUNTER — OFFICE VISIT (OUTPATIENT)
Facility: CLINIC | Age: 49
End: 2024-07-02

## 2024-07-02 VITALS
HEIGHT: 60 IN | WEIGHT: 123.6 LBS | SYSTOLIC BLOOD PRESSURE: 102 MMHG | BODY MASS INDEX: 24.26 KG/M2 | TEMPERATURE: 97.3 F | HEART RATE: 77 BPM | RESPIRATION RATE: 20 BRPM | DIASTOLIC BLOOD PRESSURE: 68 MMHG | OXYGEN SATURATION: 97 %

## 2024-07-02 DIAGNOSIS — M50.20 CERVICAL DISC HERNIATION: ICD-10-CM

## 2024-07-02 DIAGNOSIS — M54.42 CHRONIC BILATERAL LOW BACK PAIN WITH BILATERAL SCIATICA: Primary | ICD-10-CM

## 2024-07-02 DIAGNOSIS — M51.36 BULGING LUMBAR DISC: ICD-10-CM

## 2024-07-02 DIAGNOSIS — Z00.00 ANNUAL PHYSICAL EXAM: ICD-10-CM

## 2024-07-02 DIAGNOSIS — E55.9 VITAMIN D DEFICIENCY: ICD-10-CM

## 2024-07-02 DIAGNOSIS — Z01.84 IMMUNITY STATUS TESTING: ICD-10-CM

## 2024-07-02 DIAGNOSIS — M54.41 CHRONIC BILATERAL LOW BACK PAIN WITH BILATERAL SCIATICA: Primary | ICD-10-CM

## 2024-07-02 DIAGNOSIS — H10.13 ALLERGIC CONJUNCTIVITIS OF BOTH EYES: ICD-10-CM

## 2024-07-02 DIAGNOSIS — Z12.5 PROSTATE CANCER SCREENING: ICD-10-CM

## 2024-07-02 DIAGNOSIS — Z11.4 SCREENING FOR HUMAN IMMUNODEFICIENCY VIRUS WITHOUT PRESENCE OF RISK FACTORS: ICD-10-CM

## 2024-07-02 DIAGNOSIS — E78.2 MIXED HYPERLIPIDEMIA: ICD-10-CM

## 2024-07-02 DIAGNOSIS — G89.29 CHRONIC BILATERAL LOW BACK PAIN WITH BILATERAL SCIATICA: Primary | ICD-10-CM

## 2024-07-02 PROBLEM — M51.369 BULGING LUMBAR DISC: Status: ACTIVE | Noted: 2024-07-02

## 2024-07-02 RX ORDER — MELOXICAM 7.5 MG/1
7.5 TABLET ORAL DAILY
Qty: 30 TABLET | Refills: 3 | Status: SHIPPED | OUTPATIENT
Start: 2024-07-02

## 2024-07-02 RX ORDER — ERGOCALCIFEROL 1.25 MG/1
50000 CAPSULE ORAL WEEKLY
Qty: 12 CAPSULE | Refills: 1 | Status: CANCELLED | OUTPATIENT
Start: 2024-07-02

## 2024-07-02 RX ORDER — METHYLPREDNISOLONE 4 MG/1
TABLET ORAL
Qty: 1 KIT | Refills: 0 | Status: SHIPPED | OUTPATIENT
Start: 2024-07-02 | End: 2024-07-08

## 2024-07-02 RX ORDER — ATORVASTATIN CALCIUM 40 MG/1
40 TABLET, FILM COATED ORAL DAILY
Qty: 30 TABLET | Refills: 3 | Status: SHIPPED | OUTPATIENT
Start: 2024-07-02

## 2024-07-02 RX ORDER — MONTELUKAST SODIUM 10 MG/1
10 TABLET ORAL DAILY
Qty: 30 TABLET | Refills: 3 | Status: SHIPPED | OUTPATIENT
Start: 2024-07-02

## 2024-07-02 RX ORDER — AZELASTINE HYDROCHLORIDE 0.5 MG/ML
1 SOLUTION/ DROPS OPHTHALMIC 2 TIMES DAILY
Qty: 6 ML | Refills: 2 | Status: SHIPPED | OUTPATIENT
Start: 2024-07-02 | End: 2024-08-01

## 2024-07-02 RX ORDER — CETIRIZINE HYDROCHLORIDE 10 MG/1
10 TABLET ORAL DAILY
Qty: 30 TABLET | Refills: 0 | Status: SHIPPED | OUTPATIENT
Start: 2024-07-02

## 2024-07-02 RX ORDER — GABAPENTIN 300 MG/1
300 CAPSULE ORAL 2 TIMES DAILY
Qty: 180 CAPSULE | Refills: 1 | Status: SHIPPED | OUTPATIENT
Start: 2024-07-02 | End: 2024-12-29

## 2024-07-02 RX ORDER — OMEGA-3/DHA/EPA/FISH OIL 300-1000MG
2 CAPSULE ORAL DAILY
Qty: 90 CAPSULE | Refills: 0 | Status: SHIPPED | OUTPATIENT
Start: 2024-07-02

## 2024-07-02 ASSESSMENT — PATIENT HEALTH QUESTIONNAIRE - PHQ9
2. FEELING DOWN, DEPRESSED OR HOPELESS: NOT AT ALL
SUM OF ALL RESPONSES TO PHQ QUESTIONS 1-9: 0
SUM OF ALL RESPONSES TO PHQ9 QUESTIONS 1 & 2: 0
1. LITTLE INTEREST OR PLEASURE IN DOING THINGS: NOT AT ALL
SUM OF ALL RESPONSES TO PHQ QUESTIONS 1-9: 0

## 2024-07-02 NOTE — PATIENT INSTRUCTIONS
Fasting labs: 6 hours fasting prior to blood draw. You can have fruit, juice, black tea, black coffee, no dairy, no non-dairy creamer, no other foods.       [facility]  Address: [address] [place] [address]  Phone: [phone]  Fax:[phone]     [facility] at [address]  [place] [id number] [phone]

## 2024-07-02 NOTE — PROGRESS NOTES
Villa Sanchez is a 49 y.o. presents today for No chief complaint on file.      Is someone accompanying this pt? NO    Is the patient using any DME equipment during OV? NO    Depression Screenin/21/2024     3:29 PM 2023     3:34 PM 2023     3:40 PM 2023     3:18 PM 2023     2:57 PM   PHQ-9 Questionaire   Little interest or pleasure in doing things 0 0 0 0 0   Feeling down, depressed, or hopeless 0 0 0 0 0   Trouble falling or staying asleep, or sleeping too much   0     Feeling tired or having little energy   0     Poor appetite or overeating   0     Feeling bad about yourself - or that you are a failure or have let yourself or your family down   0     Trouble concentrating on things, such as reading the newspaper or watching television   0     Moving or speaking so slowly that other people could have noticed. Or the opposite - being so fidgety or restless that you have been moving around a lot more than usual   0     Thoughts that you would be better off dead, or of hurting yourself in some way   0     PHQ-9 Total Score 0 0 0 0 0   If you checked off any problems, how difficult have these problems made it for you to do your work, take care of things at home, or get along with other people?   0         Abuse Screenin/21/2024     3:00 PM 2023     3:00 PM 2023     8:00 AM 2023     3:00 PM 2023     3:00 PM 2023     2:00 PM   AMB Abuse Screening   Do you ever feel afraid of your partner? N N N N N N   Are you in a relationship with someone who physically or mentally threatens you? N N N N N N   Is it safe for you to go home? Y Y Y Y Y Y       Learning Assessment:  No question data found.    Fall Risk:       No data to display                    Coordination of Care:   1. \"Have you been to the ER, urgent care clinic since your last visit?  Hospitalized since your last visit?\" NO    2. \"Have you seen or consulted any other health care providers outside of

## 2024-07-16 ENCOUNTER — OFFICE VISIT (OUTPATIENT)
Facility: CLINIC | Age: 49
End: 2024-07-16

## 2024-07-16 VITALS
OXYGEN SATURATION: 97 % | TEMPERATURE: 98.1 F | BODY MASS INDEX: 24.81 KG/M2 | HEIGHT: 60 IN | WEIGHT: 126.4 LBS | RESPIRATION RATE: 13 BRPM | DIASTOLIC BLOOD PRESSURE: 71 MMHG | SYSTOLIC BLOOD PRESSURE: 108 MMHG | HEART RATE: 75 BPM

## 2024-07-16 DIAGNOSIS — E78.2 MIXED HYPERLIPIDEMIA: ICD-10-CM

## 2024-07-16 DIAGNOSIS — E55.9 VITAMIN D DEFICIENCY: ICD-10-CM

## 2024-07-16 DIAGNOSIS — Z11.4 SCREENING FOR HUMAN IMMUNODEFICIENCY VIRUS WITHOUT PRESENCE OF RISK FACTORS: ICD-10-CM

## 2024-07-16 DIAGNOSIS — Z01.84 IMMUNITY STATUS TESTING: ICD-10-CM

## 2024-07-16 DIAGNOSIS — Z12.5 ENCOUNTER FOR PROSTATE CANCER SCREENING: Primary | ICD-10-CM

## 2024-07-16 DIAGNOSIS — Z00.00 ANNUAL PHYSICAL EXAM: ICD-10-CM

## 2024-07-16 RX ORDER — CYCLOBENZAPRINE HCL 5 MG
5 TABLET ORAL 2 TIMES DAILY PRN
Qty: 10 TABLET | Refills: 0 | Status: SHIPPED | OUTPATIENT
Start: 2024-07-16 | End: 2024-07-26

## 2024-07-16 NOTE — PATIENT INSTRUCTIONS
Phòng thí cindy?m nh?n ?n: nh?n ?n 6 gi? tr??c khi l?y máu. B?n có th? ?n trái cây, n??c trái cây, trà ?en, cà phê ?en, không s?a, không jayce không s?a, không th?c ph?m millie jimenez.

## 2024-07-16 NOTE — PROGRESS NOTES
Villa Sanchez is a 49 y.o. year old male who presents today for   Chief Complaint   Patient presents with    Follow-up       Is someone accompanying this pt? No     Is the patient using any DME equipment during OV? No     Depression Screenin/2/2024     2:15 PM 2024     3:29 PM 2023     3:34 PM 2023     3:40 PM 2023     3:18 PM 2023     2:57 PM   PHQ-9 Questionaire   Little interest or pleasure in doing things 0 0 0 0 0 0   Feeling down, depressed, or hopeless 0 0 0 0 0 0   Trouble falling or staying asleep, or sleeping too much    0     Feeling tired or having little energy    0     Poor appetite or overeating    0     Feeling bad about yourself - or that you are a failure or have let yourself or your family down    0     Trouble concentrating on things, such as reading the newspaper or watching television    0     Moving or speaking so slowly that other people could have noticed. Or the opposite - being so fidgety or restless that you have been moving around a lot more than usual    0     Thoughts that you would be better off dead, or of hurting yourself in some way    0     PHQ-9 Total Score 0 0 0 0 0 0   If you checked off any problems, how difficult have these problems made it for you to do your work, take care of things at home, or get along with other people?    0         Abuse Screenin/21/2024     3:00 PM 2023     3:00 PM 2023     8:00 AM 2023     3:00 PM 2023     3:00 PM 2023     2:00 PM   AMB Abuse Screening   Do you ever feel afraid of your partner? N N N N N N   Are you in a relationship with someone who physically or mentally threatens you? N N N N N N   Is it safe for you to go home? Y Y Y Y Y Y       Learning Assessment:  No question data found.    Fall Risk:       No data to display                    Coordination of Care:   1. \"Have you been to the ER, urgent care clinic since your last visit?  Hospitalized since your last 
Thought content normal.         Cognition and Memory: Cognition and memory normal.         Judgment: Judgment normal.             Assessment and Plan:       1. Encounter for prostate cancer screening  -     PSA Screening; Future  2. Screening for human immunodeficiency virus without presence of risk factors  -     HIV 1/2 Ag/Ab, 4TH Generation,W Rflx Confirm; Future  3. Immunity status testing  -     Hepatitis C Antibody; Future  -     Hepatitis B Surface Antibody; Future  4. Annual physical exam  -     T4, Free; Future  -     Hemoglobin A1C; Future  -     Urinalysis with Microscopic; Future  -     Comprehensive Metabolic Panel; Future  -     CBC with Auto Differential; Future  -     TSH; Future  5. Vitamin D deficiency  -     Vitamin D 25 Hydroxy; Future  6. Mixed hyperlipidemia  -     Lipid Panel; Future             Return in about 2 weeks (around 7/30/2024) for Lab review         , labs 1-2 weeks before.          On this date 07/16/2024  I have reviewed previous notes, test results and face to face with the patient discussing the diagnosis and treatment plan as well as documenting the visit. The patient has received an After-Visit Summary for today's visit.       Electronically signed  Radha Duke, Family Nurse Practitioner     Please note: This document has been created using a voice recognition software. Unrecognized errors in transcription may be present.

## 2024-07-18 DIAGNOSIS — E55.9 VITAMIN D DEFICIENCY: ICD-10-CM

## 2024-07-18 DIAGNOSIS — Z00.00 ANNUAL PHYSICAL EXAM: ICD-10-CM

## 2024-07-25 LAB
25(OH)D3+25(OH)D2 SERPL-MCNC: 25 NG/ML (ref 30–100)
APPEARANCE UR: CLEAR
BACTERIA #/AREA URNS HPF: NORMAL /[HPF]
BASOPHILS # BLD AUTO: 0 X10E3/UL (ref 0–0.2)
BASOPHILS NFR BLD AUTO: 1 %
BILIRUB UR QL STRIP: NEGATIVE
CASTS URNS QL MICRO: NORMAL /LPF
CHOLEST SERPL-MCNC: 265 MG/DL (ref 100–199)
COLOR UR: YELLOW
EOSINOPHIL # BLD AUTO: 0.4 X10E3/UL (ref 0–0.4)
EOSINOPHIL NFR BLD AUTO: 6 %
EPI CELLS #/AREA URNS HPF: NORMAL /HPF (ref 0–10)
ERYTHROCYTE [DISTWIDTH] IN BLOOD BY AUTOMATED COUNT: 13.4 % (ref 11.6–15.4)
GLUCOSE UR QL STRIP: NEGATIVE
HBA1C MFR BLD: 5.8 % (ref 4.8–5.6)
HBV SURFACE AB SER QL: REACTIVE
HCT VFR BLD AUTO: 42.2 % (ref 37.5–51)
HCV IGG SERPL QL IA: NON REACTIVE
HDLC SERPL-MCNC: 56 MG/DL
HGB BLD-MCNC: 14.2 G/DL (ref 13–17.7)
HGB UR QL STRIP: NEGATIVE
HIV 1+2 AB+HIV1 P24 AG SERPL QL IA: NON REACTIVE
IMM GRANULOCYTES # BLD AUTO: 0 X10E3/UL (ref 0–0.1)
IMM GRANULOCYTES NFR BLD AUTO: 0 %
KETONES UR QL STRIP: NEGATIVE
LDLC SERPL CALC-MCNC: 175 MG/DL (ref 0–99)
LEUKOCYTE ESTERASE UR QL STRIP: NEGATIVE
LYMPHOCYTES # BLD AUTO: 1.6 X10E3/UL (ref 0.7–3.1)
LYMPHOCYTES NFR BLD AUTO: 25 %
MCH RBC QN AUTO: 30.7 PG (ref 26.6–33)
MCHC RBC AUTO-ENTMCNC: 33.6 G/DL (ref 31.5–35.7)
MCV RBC AUTO: 91 FL (ref 79–97)
MICRO URNS: NORMAL
MICRO URNS: NORMAL
MONOCYTES # BLD AUTO: 0.8 X10E3/UL (ref 0.1–0.9)
MONOCYTES NFR BLD AUTO: 12 %
NEUTROPHILS # BLD AUTO: 3.5 X10E3/UL (ref 1.4–7)
NEUTROPHILS NFR BLD AUTO: 56 %
NITRITE UR QL STRIP: NEGATIVE
PH UR STRIP: 5.5 [PH] (ref 5–7.5)
PLATELET # BLD AUTO: 236 X10E3/UL (ref 150–450)
PROT UR QL STRIP: NEGATIVE
PSA SERPL-MCNC: 0.4 NG/ML (ref 0–4)
RBC # BLD AUTO: 4.62 X10E6/UL (ref 4.14–5.8)
RBC #/AREA URNS HPF: NORMAL /HPF (ref 0–2)
SP GR UR STRIP: 1.01 (ref 1–1.03)
T4 FREE SERPL-MCNC: 1.31 NG/DL (ref 0.82–1.77)
TRIGL SERPL-MCNC: 182 MG/DL (ref 0–149)
TSH SERPL DL<=0.005 MIU/L-ACNC: 2.08 UIU/ML (ref 0.45–4.5)
UROBILINOGEN UR STRIP-MCNC: 0.2 MG/DL (ref 0.2–1)
VLDLC SERPL CALC-MCNC: 34 MG/DL (ref 5–40)
WBC # BLD AUTO: 6.3 X10E3/UL (ref 3.4–10.8)
WBC #/AREA URNS HPF: NORMAL /HPF (ref 0–5)

## 2024-07-26 LAB
ALBUMIN SERPL-MCNC: 4.6 G/DL (ref 4.1–5.1)
ALP SERPL-CCNC: 68 IU/L (ref 44–121)
ALT SERPL-CCNC: 19 IU/L (ref 0–44)
AST SERPL-CCNC: 22 IU/L (ref 0–40)
BILIRUB SERPL-MCNC: 0.3 MG/DL (ref 0–1.2)
BUN SERPL-MCNC: 13 MG/DL (ref 6–24)
BUN/CREAT SERPL: 14 (ref 9–20)
CALCIUM SERPL-MCNC: 9.8 MG/DL (ref 8.7–10.2)
CHLORIDE SERPL-SCNC: 103 MMOL/L (ref 96–106)
CO2 SERPL-SCNC: 23 MMOL/L (ref 20–29)
CREAT SERPL-MCNC: 0.91 MG/DL (ref 0.76–1.27)
EGFRCR SERPLBLD CKD-EPI 2021: 103 ML/MIN/1.73
GLOBULIN SER CALC-MCNC: 2.6 G/DL (ref 1.5–4.5)
GLUCOSE SERPL-MCNC: 97 MG/DL (ref 70–99)
POTASSIUM SERPL-SCNC: 4.8 MMOL/L (ref 3.5–5.2)
PROT SERPL-MCNC: 7.2 G/DL (ref 6–8.5)
SODIUM SERPL-SCNC: 143 MMOL/L (ref 134–144)

## 2024-07-31 ENCOUNTER — OFFICE VISIT (OUTPATIENT)
Facility: CLINIC | Age: 49
End: 2024-07-31
Payer: MEDICAID

## 2024-07-31 VITALS
BODY MASS INDEX: 24.7 KG/M2 | HEART RATE: 85 BPM | DIASTOLIC BLOOD PRESSURE: 68 MMHG | RESPIRATION RATE: 20 BRPM | SYSTOLIC BLOOD PRESSURE: 102 MMHG | WEIGHT: 125.8 LBS | HEIGHT: 60 IN | TEMPERATURE: 97.4 F | OXYGEN SATURATION: 97 %

## 2024-07-31 DIAGNOSIS — R73.01 IMPAIRED FASTING GLUCOSE: ICD-10-CM

## 2024-07-31 DIAGNOSIS — Z23 ENCOUNTER FOR IMMUNIZATION: ICD-10-CM

## 2024-07-31 DIAGNOSIS — E78.2 MIXED HYPERLIPIDEMIA: Primary | ICD-10-CM

## 2024-07-31 DIAGNOSIS — K05.10 GINGIVITIS: ICD-10-CM

## 2024-07-31 DIAGNOSIS — R79.89 LOW SERUM TOTAL PROTEIN LEVEL: ICD-10-CM

## 2024-07-31 DIAGNOSIS — E55.9 VITAMIN D DEFICIENCY: ICD-10-CM

## 2024-07-31 PROCEDURE — 99214 OFFICE O/P EST MOD 30 MIN: CPT

## 2024-07-31 PROCEDURE — 90739 HEPB VACC 2/4 DOSE ADULT IM: CPT

## 2024-07-31 PROCEDURE — 90677 PCV20 VACCINE IM: CPT

## 2024-07-31 PROCEDURE — 90750 HZV VACC RECOMBINANT IM: CPT

## 2024-07-31 PROCEDURE — 90472 IMMUNIZATION ADMIN EACH ADD: CPT

## 2024-07-31 PROCEDURE — 90471 IMMUNIZATION ADMIN: CPT

## 2024-07-31 RX ORDER — EZETIMIBE 10 MG/1
10 TABLET ORAL DAILY
Qty: 90 TABLET | Refills: 1 | Status: SHIPPED | OUTPATIENT
Start: 2024-07-31

## 2024-07-31 RX ORDER — ERGOCALCIFEROL 1.25 MG/1
50000 CAPSULE ORAL WEEKLY
Qty: 16 CAPSULE | Refills: 0 | Status: SHIPPED | OUTPATIENT
Start: 2024-07-31 | End: 2024-11-14

## 2024-07-31 RX ORDER — CHLORHEXIDINE GLUCONATE ORAL RINSE 1.2 MG/ML
15 SOLUTION DENTAL 2 TIMES DAILY
Qty: 1893 ML | Refills: 4 | Status: SHIPPED | OUTPATIENT
Start: 2024-07-31

## 2024-07-31 ASSESSMENT — PATIENT HEALTH QUESTIONNAIRE - PHQ9
1. LITTLE INTEREST OR PLEASURE IN DOING THINGS: NOT AT ALL
SUM OF ALL RESPONSES TO PHQ QUESTIONS 1-9: 0
SUM OF ALL RESPONSES TO PHQ9 QUESTIONS 1 & 2: 0
2. FEELING DOWN, DEPRESSED OR HOPELESS: NOT AT ALL

## 2024-07-31 NOTE — ASSESSMENT & PLAN NOTE
Hemoglobin A1C   Date Value Ref Range Status   07/24/2024 5.8 (H) 4.8 - 5.6 % Final     Comment:                 Prediabetes: 5.7 - 6.4           Diabetes: >6.4           Glycemic control for adults with diabetes: <7.0     Healthy lifestyle modification to reduce cholesterol:  - Increase fiber in diet like fruits, vegetables, whole grains (like oatmeal), dried beans and peas, nuts and seeds, soy products and fat-free or low-fat dairy products.  - Replace butter, margarine, and hydrogenated or partially hydrogenated oils with olive  - Bake, broil, grill, or steam foods instead of frying them  - Replace red meat with fish, poultry, and soy protein   - Limit processed and packaged foods like chips, crackers, and cookies.  - Increase activity. Get at least 30 minutes of exercise on most days of the week. Walking is a good choice. You also may want to do other activities, such as running, swimming, cycling, or playing tennis or team sports.

## 2024-07-31 NOTE — PROGRESS NOTES
Villa Sanchez is a 49 y.o. presents today for   Chief Complaint   Patient presents with    2 Week Follow-Up    Discuss Labs       Is someone accompanying this pt? NO    Is the patient using any DME equipment during OV? NO    Depression Screenin/31/2024     9:25 AM 2024     2:15 PM 2024     3:29 PM 2023     3:34 PM 2023     3:40 PM 2023     3:18 PM 2023     2:57 PM   PHQ-9 Questionaire   Little interest or pleasure in doing things 0 0 0 0 0 0 0   Feeling down, depressed, or hopeless 0 0 0 0 0 0 0   Trouble falling or staying asleep, or sleeping too much     0     Feeling tired or having little energy     0     Poor appetite or overeating     0     Feeling bad about yourself - or that you are a failure or have let yourself or your family down     0     Trouble concentrating on things, such as reading the newspaper or watching television     0     Moving or speaking so slowly that other people could have noticed. Or the opposite - being so fidgety or restless that you have been moving around a lot more than usual     0     Thoughts that you would be better off dead, or of hurting yourself in some way     0     PHQ-9 Total Score 0 0 0 0 0 0 0   If you checked off any problems, how difficult have these problems made it for you to do your work, take care of things at home, or get along with other people?     0         Abuse Screenin/21/2024     3:00 PM 2023     3:00 PM 2023     8:00 AM 2023     3:00 PM 2023     3:00 PM 2023     2:00 PM   AMB Abuse Screening   Do you ever feel afraid of your partner? N N N N N N   Are you in a relationship with someone who physically or mentally threatens you? N N N N N N   Is it safe for you to go home? Y Y Y Y Y Y       Learning Assessment:  No question data found.    Fall Risk:       No data to display                    Coordination of Care:   1. \"Have you been to the ER, urgent care clinic since your last

## 2024-07-31 NOTE — PROGRESS NOTES
Patient ID: Villa Sanchez is a 49 y.o. male established patient presents for the following:      Subjective:     Primary historian: patient    2 Week Follow-Up and Discuss Labs       HPI     He is doing well, denies acute complaints but does admit to poor appetite.  He states that he normally eats 1 full meal of rice with some meat and some slight vegetables and maybe symptoms snack negative rice or broth.  She denies any nausea or vomiting, no recent fevers, no abdominal pain, no diarrhea constipation.    No past medical history on file.    No past surgical history on file.    Current Outpatient Medications   Medication Sig Dispense Refill    vitamin D (ERGOCALCIFEROL) 1.25 MG (59508 UT) CAPS capsule Take 1 capsule by mouth once a week for 16 doses 16 capsule 0    ezetimibe (ZETIA) 10 MG tablet Take 1 tablet by mouth daily 90 tablet 1    chlorhexidine (PERIDEX) 0.12 % solution Swish and spit 15 mLs 2 times daily 1893 mL 4    atorvastatin (LIPITOR) 40 MG tablet Take 1 tablet by mouth daily 30 tablet 3    fish oil-omega-3 fatty acids 1000 MG capsule Take 2 capsules by mouth daily 90 capsule 0    meloxicam (MOBIC) 7.5 MG tablet Take 1 tablet by mouth daily 30 tablet 3    montelukast (SINGULAIR) 10 MG tablet Take 1 tablet by mouth daily 30 tablet 3    cetirizine (ZYRTEC) 10 MG tablet Take 1 tablet by mouth daily 30 tablet 0    gabapentin (NEURONTIN) 300 MG capsule Take 1 capsule by mouth 2 times daily for 180 days. Intended supply: 90 days Max Daily Amount: 600 mg 180 capsule 1    aspirin 81 MG EC tablet Take 1 tablet by mouth daily 90 tablet 3     No current facility-administered medications for this visit.          ROS   Review of Systems   Constitutional:  Positive for appetite change (lack of appetite). Negative for fatigue, fever and unexpected weight change.   HENT:  Negative for ear pain, postnasal drip, sinus pressure, sore throat, trouble swallowing and voice change.    Eyes: Negative.    Respiratory:

## 2024-09-17 ENCOUNTER — LAB (OUTPATIENT)
Facility: CLINIC | Age: 49
End: 2024-09-17

## 2024-09-17 DIAGNOSIS — E78.2 MIXED HYPERLIPIDEMIA: ICD-10-CM

## 2024-09-17 DIAGNOSIS — E55.9 VITAMIN D DEFICIENCY: ICD-10-CM

## 2024-09-18 LAB
25(OH)D3+25(OH)D2 SERPL-MCNC: 31.4 NG/ML (ref 30–100)
ALBUMIN SERPL-MCNC: 4.5 G/DL (ref 4.1–5.1)
ALP SERPL-CCNC: 60 IU/L (ref 44–121)
ALT SERPL-CCNC: 27 IU/L (ref 0–44)
AST SERPL-CCNC: 27 IU/L (ref 0–40)
BILIRUB DIRECT SERPL-MCNC: 0.11 MG/DL (ref 0–0.4)
BILIRUB SERPL-MCNC: 0.4 MG/DL (ref 0–1.2)
CHOLEST SERPL-MCNC: 247 MG/DL (ref 100–199)
HDLC SERPL-MCNC: 60 MG/DL
LDLC SERPL CALC-MCNC: 155 MG/DL (ref 0–99)
PROT SERPL-MCNC: 7.2 G/DL (ref 6–8.5)
TRIGL SERPL-MCNC: 176 MG/DL (ref 0–149)
VLDLC SERPL CALC-MCNC: 32 MG/DL (ref 5–40)

## 2024-11-01 ENCOUNTER — OFFICE VISIT (OUTPATIENT)
Facility: CLINIC | Age: 49
End: 2024-11-01

## 2024-11-01 VITALS
DIASTOLIC BLOOD PRESSURE: 72 MMHG | WEIGHT: 123.6 LBS | HEIGHT: 60 IN | OXYGEN SATURATION: 97 % | RESPIRATION RATE: 12 BRPM | BODY MASS INDEX: 24.26 KG/M2 | SYSTOLIC BLOOD PRESSURE: 115 MMHG | TEMPERATURE: 98.2 F | HEART RATE: 89 BPM

## 2024-11-01 DIAGNOSIS — L30.9 ACUTE DERMATITIS: Primary | ICD-10-CM

## 2024-11-01 DIAGNOSIS — Z23 ENCOUNTER FOR IMMUNIZATION: ICD-10-CM

## 2024-11-01 RX ORDER — PREDNISONE 20 MG/1
TABLET ORAL
Qty: 24 TABLET | Refills: 0 | Status: SHIPPED | OUTPATIENT
Start: 2024-11-01

## 2024-11-01 RX ORDER — BENZOCAINE/MENTHOL 6 MG-10 MG
LOZENGE MUCOUS MEMBRANE
Qty: 30 G | Refills: 5 | Status: SHIPPED | OUTPATIENT
Start: 2024-11-01 | End: 2024-11-08

## 2024-11-01 RX ORDER — CETIRIZINE HYDROCHLORIDE 10 MG/1
10 TABLET ORAL DAILY
Qty: 30 TABLET | Refills: 0 | Status: SHIPPED | OUTPATIENT
Start: 2024-11-01

## 2024-11-01 SDOH — ECONOMIC STABILITY: FOOD INSECURITY: WITHIN THE PAST 12 MONTHS, YOU WORRIED THAT YOUR FOOD WOULD RUN OUT BEFORE YOU GOT MONEY TO BUY MORE.: NEVER TRUE

## 2024-11-01 SDOH — ECONOMIC STABILITY: FOOD INSECURITY: WITHIN THE PAST 12 MONTHS, THE FOOD YOU BOUGHT JUST DIDN'T LAST AND YOU DIDN'T HAVE MONEY TO GET MORE.: NEVER TRUE

## 2024-11-01 SDOH — ECONOMIC STABILITY: INCOME INSECURITY: HOW HARD IS IT FOR YOU TO PAY FOR THE VERY BASICS LIKE FOOD, HOUSING, MEDICAL CARE, AND HEATING?: NOT HARD AT ALL

## 2024-11-01 ASSESSMENT — PATIENT HEALTH QUESTIONNAIRE - PHQ9
SUM OF ALL RESPONSES TO PHQ QUESTIONS 1-9: 0
SUM OF ALL RESPONSES TO PHQ QUESTIONS 1-9: 0
2. FEELING DOWN, DEPRESSED OR HOPELESS: NOT AT ALL
SUM OF ALL RESPONSES TO PHQ QUESTIONS 1-9: 0
1. LITTLE INTEREST OR PLEASURE IN DOING THINGS: NOT AT ALL
SUM OF ALL RESPONSES TO PHQ QUESTIONS 1-9: 0
SUM OF ALL RESPONSES TO PHQ9 QUESTIONS 1 & 2: 0

## 2024-11-01 NOTE — PROGRESS NOTES
Patient ID: Villa Sanchez is a 49 y.o. male established patient presents for the following:      Subjective:     Primary historian: patient    3 Month Follow-Up       Past Medical History:   Diagnosis Date    Hyperlipidemia     Hypertension        Past Surgical History:   Procedure Laterality Date    COLONOSCOPY N/A 11/26/2024    SCREENING COLONOSCOPY, NOT HIGH RISK w/cold snare polypectomy performed by Cam Wong MD at [facility] ENDOSCOPY    LEG SURGERY Right     UPPER GASTROINTESTINAL ENDOSCOPY N/A 11/26/2024    EGD - ESOPHAGOGASTRODUODENOSCOPY w/cold bxs performed by Cam Wong MD at [facility] ENDOSCOPY       Current Outpatient Medications   Medication Sig Dispense Refill    predniSONE (DELTASONE) 20 MG tablet Take 3 tablets for four days, then 2 tablets for four days, then 1 tablet for four days, then stop 24 tablet 0    cetirizine (ZYRTEC) 10 MG tablet Take 1 tablet by mouth daily 30 tablet 0    vitamin D (ERGOCALCIFEROL) 1.25 MG (86278 UT) CAPS capsule Take 1 capsule by mouth once a week for 16 doses 16 capsule 0    ezetimibe (ZETIA) 10 MG tablet Take 1 tablet by mouth daily 90 tablet 1    chlorhexidine (PERIDEX) 0.12 % solution Swish and spit 15 mLs 2 times daily 1893 mL 4    atorvastatin (LIPITOR) 40 MG tablet Take 1 tablet by mouth daily 30 tablet 3    fish oil-omega-3 fatty acids 1000 MG capsule Take 2 capsules by mouth daily 90 capsule 0    meloxicam (MOBIC) 7.5 MG tablet Take 1 tablet by mouth daily 30 tablet 3    montelukast (SINGULAIR) 10 MG tablet Take 1 tablet by mouth daily 30 tablet 3    gabapentin (NEURONTIN) 300 MG capsule Take 1 capsule by mouth 2 times daily for 180 days. Intended supply: 90 days Max Daily Amount: 600 mg 180 capsule 1    aspirin 81 MG EC tablet Take 1 tablet by mouth daily 90 tablet 3     No current facility-administered medications for this visit.          ROS   Review of Systems   Constitutional:  Negative for appetite change, fatigue, fever and unexpected weight change.

## 2024-11-01 NOTE — PROGRESS NOTES
Villa Sanchez is a 49 y.o. year old male who presents today for   Chief Complaint   Patient presents with    3 Month Follow-Up        \"Have you been to the ER, urgent care clinic since your last visit?  Hospitalized since your last visit?\"   NO     “Have you seen or consulted any other health care providers outside our system since your last visit?”   NO       “Have you had a colorectal cancer screening such as a colonoscopy/FIT/Cologuard?    NO Doing in November    No colonoscopy on file  No cologuard on file  No FIT/FOBT on file   No flexible sigmoidoscopy on file           - Vamsi Ware/EMMA Bryant  [facility]  Phone: [phone]  Fax: [phone]

## 2025-02-03 ENCOUNTER — OFFICE VISIT (OUTPATIENT)
Facility: CLINIC | Age: 50
End: 2025-02-03

## 2025-02-03 VITALS
WEIGHT: 125 LBS | OXYGEN SATURATION: 98 % | DIASTOLIC BLOOD PRESSURE: 74 MMHG | TEMPERATURE: 97.3 F | BODY MASS INDEX: 24.54 KG/M2 | SYSTOLIC BLOOD PRESSURE: 118 MMHG | HEIGHT: 60 IN | HEART RATE: 85 BPM

## 2025-02-03 DIAGNOSIS — M50.20 CERVICAL DISC HERNIATION: ICD-10-CM

## 2025-02-03 DIAGNOSIS — K29.70 GASTRITIS, HELICOBACTER PYLORI: Primary | ICD-10-CM

## 2025-02-03 DIAGNOSIS — B96.81 GASTRITIS, HELICOBACTER PYLORI: Primary | ICD-10-CM

## 2025-02-03 LAB — HBA1C MFR BLD: 5.5 %

## 2025-02-03 RX ORDER — CLARITHROMYCIN 500 MG/1
500 TABLET ORAL 2 TIMES DAILY
Qty: 28 TABLET | Refills: 0 | Status: SHIPPED | OUTPATIENT
Start: 2025-02-03 | End: 2025-02-17

## 2025-02-03 RX ORDER — GABAPENTIN 300 MG/1
300 CAPSULE ORAL 2 TIMES DAILY
Qty: 180 CAPSULE | Refills: 1 | Status: SHIPPED | OUTPATIENT
Start: 2025-02-03 | End: 2025-08-02

## 2025-02-03 RX ORDER — PANTOPRAZOLE SODIUM 20 MG/1
20 TABLET, DELAYED RELEASE ORAL DAILY
Qty: 30 TABLET | Refills: 3 | Status: SHIPPED | OUTPATIENT
Start: 2025-02-03

## 2025-02-03 RX ORDER — AMOXICILLIN 500 MG/1
1000 CAPSULE ORAL 2 TIMES DAILY
Qty: 56 CAPSULE | Refills: 0 | Status: SHIPPED | OUTPATIENT
Start: 2025-02-03 | End: 2025-02-17

## 2025-02-03 SDOH — ECONOMIC STABILITY: FOOD INSECURITY: WITHIN THE PAST 12 MONTHS, THE FOOD YOU BOUGHT JUST DIDN'T LAST AND YOU DIDN'T HAVE MONEY TO GET MORE.: NEVER TRUE

## 2025-02-03 SDOH — ECONOMIC STABILITY: FOOD INSECURITY: WITHIN THE PAST 12 MONTHS, YOU WORRIED THAT YOUR FOOD WOULD RUN OUT BEFORE YOU GOT MONEY TO BUY MORE.: NEVER TRUE

## 2025-02-03 ASSESSMENT — ENCOUNTER SYMPTOMS
EYES NEGATIVE: 1
BLOOD IN STOOL: 0
WHEEZING: 0
COUGH: 0
SORE THROAT: 0
NAUSEA: 0
DIARRHEA: 0
SHORTNESS OF BREATH: 0
SINUS PRESSURE: 0
TROUBLE SWALLOWING: 0
VOMITING: 0
CONSTIPATION: 0
VOICE CHANGE: 0

## 2025-02-03 ASSESSMENT — PATIENT HEALTH QUESTIONNAIRE - PHQ9
SUM OF ALL RESPONSES TO PHQ QUESTIONS 1-9: 0
SUM OF ALL RESPONSES TO PHQ9 QUESTIONS 1 & 2: 0
SUM OF ALL RESPONSES TO PHQ QUESTIONS 1-9: 0
1. LITTLE INTEREST OR PLEASURE IN DOING THINGS: NOT AT ALL
2. FEELING DOWN, DEPRESSED OR HOPELESS: NOT AT ALL

## 2025-02-03 NOTE — PROGRESS NOTES
Villa Sanchez is a 49 y.o. year old male who presents today for   Chief Complaint   Patient presents with    Follow-up        \"Have you been to the ER, urgent care clinic since your last visit?  Hospitalized since your last visit?\"   NO     “Have you seen or consulted any other health care providers outside our system since your last visit?”   NO             - EMMA Lopez  [facility]  Phone: [phone]  Fax: [phone]

## 2025-02-03 NOTE — PROGRESS NOTES
Patient ID: Villa Sanchez is a 49 y.o. male established patient presents for the following:      Subjective:     Primary historian: patient    Follow-up       HPI   L[place]anese  was used for encounter.  He denies any acute complaints.  He states that he has not been given antibiotics for H. pylori yet.    Past Medical History:   Diagnosis Date    Hyperlipidemia     Hypertension        Past Surgical History:   Procedure Laterality Date    COLONOSCOPY N/A 11/26/2024    SCREENING COLONOSCOPY, NOT HIGH RISK w/cold snare polypectomy performed by Cam Wong MD at [facility] ENDOSCOPY    LEG SURGERY Right     UPPER GASTROINTESTINAL ENDOSCOPY N/A 11/26/2024    EGD - ESOPHAGOGASTRODUODENOSCOPY w/cold bxs performed by Cam Wong MD at [facility] ENDOSCOPY       Current Outpatient Medications   Medication Sig Dispense Refill    pantoprazole (PROTONIX) 20 MG tablet Take 1 tablet by mouth daily 30 tablet 3    amoxicillin (AMOXIL) 500 MG capsule Take 2 capsules by mouth 2 times daily for 14 days 56 capsule 0    clarithromycin (BIAXIN) 500 MG tablet Take 1 tablet by mouth 2 times daily for 14 days 28 tablet 0    gabapentin (NEURONTIN) 300 MG capsule Take 1 capsule by mouth 2 times daily for 180 days. Intended supply: 90 days Max Daily Amount: 600 mg 180 capsule 1    predniSONE (DELTASONE) 20 MG tablet Take 3 tablets for four days, then 2 tablets for four days, then 1 tablet for four days, then stop 24 tablet 0    cetirizine (ZYRTEC) 10 MG tablet Take 1 tablet by mouth daily 30 tablet 0    ezetimibe (ZETIA) 10 MG tablet Take 1 tablet by mouth daily 90 tablet 1    chlorhexidine (PERIDEX) 0.12 % solution Swish and spit 15 mLs 2 times daily 1893 mL 4    atorvastatin (LIPITOR) 40 MG tablet Take 1 tablet by mouth daily 30 tablet 3    fish oil-omega-3 fatty acids 1000 MG capsule Take 2 capsules by mouth daily 90 capsule 0    meloxicam (MOBIC) 7.5 MG tablet Take 1 tablet by mouth daily 30 tablet 3    montelukast (SINGULAIR) 10

## 2025-04-25 ENCOUNTER — OFFICE VISIT (OUTPATIENT)
Facility: CLINIC | Age: 50
End: 2025-04-25
Payer: MEDICAID

## 2025-04-25 VITALS
TEMPERATURE: 97.7 F | HEIGHT: 60 IN | HEART RATE: 80 BPM | BODY MASS INDEX: 24.19 KG/M2 | RESPIRATION RATE: 14 BRPM | SYSTOLIC BLOOD PRESSURE: 126 MMHG | OXYGEN SATURATION: 96 % | WEIGHT: 123.2 LBS | DIASTOLIC BLOOD PRESSURE: 86 MMHG

## 2025-04-25 DIAGNOSIS — K08.9 CHRONIC DENTAL PAIN: ICD-10-CM

## 2025-04-25 DIAGNOSIS — M50.20 CERVICAL DISC HERNIATION: ICD-10-CM

## 2025-04-25 DIAGNOSIS — K29.70 GASTRITIS, HELICOBACTER PYLORI: ICD-10-CM

## 2025-04-25 DIAGNOSIS — L30.9 ACUTE DERMATITIS: ICD-10-CM

## 2025-04-25 DIAGNOSIS — G89.29 CHRONIC DENTAL PAIN: ICD-10-CM

## 2025-04-25 DIAGNOSIS — E55.9 VITAMIN D DEFICIENCY: ICD-10-CM

## 2025-04-25 DIAGNOSIS — H66.004 RECURRENT ACUTE SUPPURATIVE OTITIS MEDIA OF RIGHT EAR WITHOUT SPONTANEOUS RUPTURE OF TYMPANIC MEMBRANE: Primary | ICD-10-CM

## 2025-04-25 DIAGNOSIS — K05.10 GINGIVITIS: ICD-10-CM

## 2025-04-25 DIAGNOSIS — E78.2 MIXED HYPERLIPIDEMIA: ICD-10-CM

## 2025-04-25 DIAGNOSIS — B96.81 GASTRITIS, HELICOBACTER PYLORI: ICD-10-CM

## 2025-04-25 DIAGNOSIS — H10.13 ALLERGIC CONJUNCTIVITIS OF BOTH EYES: ICD-10-CM

## 2025-04-25 PROCEDURE — 99214 OFFICE O/P EST MOD 30 MIN: CPT

## 2025-04-25 RX ORDER — LIDOCAINE HYDROCHLORIDE 20 MG/ML
15 SOLUTION OROPHARYNGEAL PRN
Qty: 100 ML | Refills: 3 | Status: SHIPPED | OUTPATIENT
Start: 2025-04-25

## 2025-04-25 RX ORDER — PANTOPRAZOLE SODIUM 20 MG/1
20 TABLET, DELAYED RELEASE ORAL DAILY
Qty: 30 TABLET | Refills: 3 | Status: SHIPPED | OUTPATIENT
Start: 2025-04-25

## 2025-04-25 RX ORDER — CHLORHEXIDINE GLUCONATE ORAL RINSE 1.2 MG/ML
15 SOLUTION DENTAL 2 TIMES DAILY
Qty: 1893 ML | Refills: 4 | Status: SHIPPED | OUTPATIENT
Start: 2025-04-25

## 2025-04-25 RX ORDER — ASPIRIN 81 MG/1
81 TABLET ORAL DAILY
Qty: 90 TABLET | Refills: 3 | Status: SHIPPED | OUTPATIENT
Start: 2025-04-25

## 2025-04-25 RX ORDER — CETIRIZINE HYDROCHLORIDE 10 MG/1
10 TABLET ORAL DAILY
Qty: 90 TABLET | Refills: 1 | Status: SHIPPED | OUTPATIENT
Start: 2025-04-25

## 2025-04-25 RX ORDER — ERGOCALCIFEROL 1.25 MG/1
50000 CAPSULE, LIQUID FILLED ORAL WEEKLY
Qty: 16 CAPSULE | Refills: 0 | Status: SHIPPED | OUTPATIENT
Start: 2025-04-25 | End: 2025-08-09

## 2025-04-25 RX ORDER — ATORVASTATIN CALCIUM 40 MG/1
40 TABLET, FILM COATED ORAL DAILY
Qty: 30 TABLET | Refills: 3 | Status: SHIPPED | OUTPATIENT
Start: 2025-04-25

## 2025-04-25 RX ORDER — GABAPENTIN 300 MG/1
300 CAPSULE ORAL 2 TIMES DAILY
Qty: 180 CAPSULE | Refills: 1 | Status: SHIPPED | OUTPATIENT
Start: 2025-04-25 | End: 2025-10-22

## 2025-04-25 RX ORDER — OMEGA-3/DHA/EPA/FISH OIL 300-1000MG
2 CAPSULE ORAL DAILY
Qty: 90 CAPSULE | Refills: 0 | Status: SHIPPED | OUTPATIENT
Start: 2025-04-25

## 2025-04-25 RX ORDER — EZETIMIBE 10 MG/1
10 TABLET ORAL DAILY
Qty: 90 TABLET | Refills: 1 | Status: SHIPPED | OUTPATIENT
Start: 2025-04-25

## 2025-04-25 RX ORDER — MONTELUKAST SODIUM 10 MG/1
10 TABLET ORAL DAILY
Qty: 30 TABLET | Refills: 3 | Status: SHIPPED | OUTPATIENT
Start: 2025-04-25

## 2025-04-25 NOTE — PROGRESS NOTES
Villa Sanchez is a 50 y.o. year old male who presents today for   Chief Complaint   Patient presents with    Jaw Pain        \"Have you been to the ER, urgent care clinic since your last visit?  Hospitalized since your last visit?\"   NO     “Have you seen or consulted any other health care providers outside our system since your last visit?”   NO             - LORENZO Harper  [facility]  Phone: [phone]  Fax: [phone]

## 2025-04-25 NOTE — PROGRESS NOTES
Patient ID: Villa Sanchez is a 50 y.o. male established patient presents for the following:      Subjective:     Primary historian: patient    Jaw Pain (Tooth pain for about 2 week. Face was swollen. Has used salt water rise, seems to help a bit. ) and Headache (Has had pain for about a month. Pain level 9./)       HPI         Past Medical History:   Diagnosis Date    Hyperlipidemia     Hypertension        Past Surgical History:   Procedure Laterality Date    COLONOSCOPY N/A 11/26/2024    SCREENING COLONOSCOPY, NOT HIGH RISK w/cold snare polypectomy performed by Cam Wong MD at [facility] ENDOSCOPY    LEG SURGERY Right     UPPER GASTROINTESTINAL ENDOSCOPY N/A 11/26/2024    EGD - ESOPHAGOGASTRODUODENOSCOPY w/cold bxs performed by Cam Wong MD at [facility] ENDOSCOPY       Current Outpatient Medications   Medication Sig Dispense Refill    chlorhexidine (PERIDEX) 0.12 % solution Swish and spit 15 mLs 2 times daily 1893 mL 4    lidocaine viscous hcl (XYLOCAINE) 2 % SOLN solution Take 15 mLs by mouth as needed for Dental Pain 100 mL 3    cetirizine (ZYRTEC) 10 MG tablet Take 1 tablet by mouth daily 90 tablet 1    atorvastatin (LIPITOR) 40 MG tablet Take 1 tablet by mouth daily 30 tablet 3    aspirin 81 MG EC tablet Take 1 tablet by mouth daily 90 tablet 3    ezetimibe (ZETIA) 10 MG tablet Take 1 tablet by mouth daily 90 tablet 1    fish oil-omega-3 fatty acids 1000 MG capsule Take 2 capsules by mouth daily 90 capsule 0    gabapentin (NEURONTIN) 300 MG capsule Take 1 capsule by mouth 2 times daily for 180 days. Intended supply: 90 days Max Daily Amount: 600 mg 180 capsule 1    montelukast (SINGULAIR) 10 MG tablet Take 1 tablet by mouth daily 30 tablet 3    vitamin D (ERGOCALCIFEROL) 1.25 MG (89247 UT) CAPS capsule Take 1 capsule by mouth once a week for 16 doses 16 capsule 0    pantoprazole (PROTONIX) 20 MG tablet Take 1 tablet by mouth daily 30 tablet 3    meloxicam (MOBIC) 7.5 MG tablet Take 1 tablet by mouth daily 30

## 2025-05-05 ASSESSMENT — ENCOUNTER SYMPTOMS
DIARRHEA: 0
CONSTIPATION: 0
EYES NEGATIVE: 1
VOMITING: 0
BLOOD IN STOOL: 0
NAUSEA: 0
WHEEZING: 0
SINUS PRESSURE: 0
SORE THROAT: 0
TROUBLE SWALLOWING: 0
VOICE CHANGE: 0
COUGH: 0
SHORTNESS OF BREATH: 0